# Patient Record
Sex: MALE | Race: WHITE
[De-identification: names, ages, dates, MRNs, and addresses within clinical notes are randomized per-mention and may not be internally consistent; named-entity substitution may affect disease eponyms.]

---

## 2018-10-29 ENCOUNTER — HOSPITAL ENCOUNTER (INPATIENT)
Dept: HOSPITAL 63 - ER | Age: 48
LOS: 1 days | Discharge: HOME | DRG: 684 | End: 2018-10-30
Attending: INTERNAL MEDICINE | Admitting: INTERNAL MEDICINE
Payer: OTHER GOVERNMENT

## 2018-10-29 VITALS — DIASTOLIC BLOOD PRESSURE: 78 MMHG | SYSTOLIC BLOOD PRESSURE: 117 MMHG

## 2018-10-29 VITALS — BODY MASS INDEX: 26.11 KG/M2 | HEIGHT: 73 IN | WEIGHT: 197 LBS

## 2018-10-29 DIAGNOSIS — N18.9: ICD-10-CM

## 2018-10-29 DIAGNOSIS — Z82.49: ICD-10-CM

## 2018-10-29 DIAGNOSIS — K72.90: ICD-10-CM

## 2018-10-29 DIAGNOSIS — N17.9: Primary | ICD-10-CM

## 2018-10-29 DIAGNOSIS — E86.0: ICD-10-CM

## 2018-10-29 DIAGNOSIS — K21.9: ICD-10-CM

## 2018-10-29 DIAGNOSIS — E87.6: ICD-10-CM

## 2018-10-29 DIAGNOSIS — F41.9: ICD-10-CM

## 2018-10-29 DIAGNOSIS — R53.82: ICD-10-CM

## 2018-10-29 DIAGNOSIS — Z82.5: ICD-10-CM

## 2018-10-29 LAB
ALBUMIN SERPL-MCNC: 4.4 G/DL (ref 3.4–5)
ALP SERPL-CCNC: 109 U/L (ref 46–116)
ALT SERPL-CCNC: 74 U/L (ref 16–63)
AMPHETAMINE/METHAMPHETAMINE: (no result)
ANION GAP SERPL CALC-SCNC: 14 MMOL/L (ref 6–14)
APTT PPP: YELLOW S
AST SERPL-CCNC: 42 U/L (ref 15–37)
BACTERIA #/AREA URNS HPF: 0 /HPF
BARBITURATES UR-MCNC: (no result) UG/ML
BASOPHILS # BLD AUTO: 0.1 X10^3/UL (ref 0–0.2)
BASOPHILS NFR BLD: 1 % (ref 0–3)
BENZODIAZ UR-MCNC: (no result) UG/L
BILIRUB DIRECT SERPL-MCNC: 0.1 MG/DL (ref 0–0.2)
BILIRUB SERPL-MCNC: 0.5 MG/DL (ref 0.2–1)
BILIRUB UR QL STRIP: (no result)
CA-I SERPL ISE-MCNC: 16 MG/DL (ref 8–26)
CALCIUM SERPL-MCNC: 9 MG/DL (ref 8.5–10.1)
CANNABINOIDS UR-MCNC: (no result) UG/L
CHLORIDE SERPL-SCNC: 101 MMOL/L (ref 98–107)
CO2 SERPL-SCNC: 26 MMOL/L (ref 21–32)
COCAINE UR-MCNC: (no result) NG/ML
CREAT SERPL-MCNC: 1.4 MG/DL (ref 0.7–1.3)
EOSINOPHIL NFR BLD: 0.2 X10^3/UL (ref 0–0.7)
EOSINOPHIL NFR BLD: 2 % (ref 0–3)
ERYTHROCYTE [DISTWIDTH] IN BLOOD BY AUTOMATED COUNT: 13.9 % (ref 11.5–14.5)
FIBRINOGEN PPP-MCNC: CLEAR MG/DL
GFR SERPLBLD BASED ON 1.73 SQ M-ARVRAT: 53.9 ML/MIN
GLUCOSE SERPL-MCNC: 107 MG/DL (ref 70–99)
GLUCOSE UR STRIP-MCNC: (no result) MG/DL
HCT VFR BLD CALC: 46.5 % (ref 39–53)
HGB BLD-MCNC: 16.1 G/DL (ref 13–17.5)
LIPASE: 173 U/L (ref 73–393)
LYMPHOCYTES # BLD: 4.8 X10^3/UL (ref 1–4.8)
LYMPHOCYTES NFR BLD AUTO: 48 % (ref 24–48)
MAGNESIUM SERPL-MCNC: 2 MG/DL (ref 1.8–2.4)
MCH RBC QN AUTO: 29 PG (ref 25–35)
MCHC RBC AUTO-ENTMCNC: 35 G/DL (ref 31–37)
MCV RBC AUTO: 85 FL (ref 79–100)
METHADONE SERPL-MCNC: (no result) NG/ML
MONO #: 0.8 X10^3/UL (ref 0–1.1)
MONOCYTES NFR BLD: 8 % (ref 0–9)
NEUT #: 4.1 X10^3UL (ref 1.8–7.7)
NEUTROPHILS NFR BLD AUTO: 41 % (ref 31–73)
NITRITE UR QL STRIP: (no result)
OPIATES UR-MCNC: (no result) NG/ML
PCP SERPL-MCNC: (no result) MG/DL
PLATELET # BLD AUTO: 231 X10^3/UL (ref 140–400)
POTASSIUM SERPL-SCNC: 3 MMOL/L (ref 3.5–5.1)
PROT SERPL-MCNC: 7.7 G/DL (ref 6.4–8.2)
RBC # BLD AUTO: 5.49 X10^6/UL (ref 4.3–5.7)
RBC #/AREA URNS HPF: 0 /HPF (ref 0–2)
SODIUM SERPL-SCNC: 141 MMOL/L (ref 136–145)
SP GR UR STRIP: 1.01
SQUAMOUS #/AREA URNS LPF: (no result) /LPF
UROBILINOGEN UR-MCNC: 0.2 MG/DL
WBC # BLD AUTO: 10.1 X10^3/UL (ref 4–11)
WBC #/AREA URNS HPF: 0 /HPF (ref 0–4)

## 2018-10-29 PROCEDURE — 85610 PROTHROMBIN TIME: CPT

## 2018-10-29 PROCEDURE — 36415 COLL VENOUS BLD VENIPUNCTURE: CPT

## 2018-10-29 PROCEDURE — 96374 THER/PROPH/DIAG INJ IV PUSH: CPT

## 2018-10-29 PROCEDURE — G0479 DRUG TEST PRESUMP NOT OPT: HCPCS

## 2018-10-29 PROCEDURE — 80048 BASIC METABOLIC PNL TOTAL CA: CPT

## 2018-10-29 PROCEDURE — 83880 ASSAY OF NATRIURETIC PEPTIDE: CPT

## 2018-10-29 PROCEDURE — 96360 HYDRATION IV INFUSION INIT: CPT

## 2018-10-29 PROCEDURE — 71275 CT ANGIOGRAPHY CHEST: CPT

## 2018-10-29 PROCEDURE — 93005 ELECTROCARDIOGRAM TRACING: CPT

## 2018-10-29 PROCEDURE — 84484 ASSAY OF TROPONIN QUANT: CPT

## 2018-10-29 PROCEDURE — 85379 FIBRIN DEGRADATION QUANT: CPT

## 2018-10-29 PROCEDURE — 80307 DRUG TEST PRSMV CHEM ANLYZR: CPT

## 2018-10-29 PROCEDURE — 85025 COMPLETE CBC W/AUTO DIFF WBC: CPT

## 2018-10-29 PROCEDURE — 84443 ASSAY THYROID STIM HORMONE: CPT

## 2018-10-29 PROCEDURE — 71046 X-RAY EXAM CHEST 2 VIEWS: CPT

## 2018-10-29 PROCEDURE — 80061 LIPID PANEL: CPT

## 2018-10-29 PROCEDURE — 83690 ASSAY OF LIPASE: CPT

## 2018-10-29 PROCEDURE — 82550 ASSAY OF CK (CPK): CPT

## 2018-10-29 PROCEDURE — 93306 TTE W/DOPPLER COMPLETE: CPT

## 2018-10-29 PROCEDURE — 81001 URINALYSIS AUTO W/SCOPE: CPT

## 2018-10-29 PROCEDURE — 80076 HEPATIC FUNCTION PANEL: CPT

## 2018-10-29 PROCEDURE — 85730 THROMBOPLASTIN TIME PARTIAL: CPT

## 2018-10-29 PROCEDURE — 93970 EXTREMITY STUDY: CPT

## 2018-10-29 PROCEDURE — 96372 THER/PROPH/DIAG INJ SC/IM: CPT

## 2018-10-29 PROCEDURE — 83735 ASSAY OF MAGNESIUM: CPT

## 2018-10-29 RX ADMIN — SODIUM CHLORIDE, SODIUM LACTATE, POTASSIUM CHLORIDE, AND CALCIUM CHLORIDE SCH MLS/HR: .6; .31; .03; .02 INJECTION, SOLUTION INTRAVENOUS at 22:30

## 2018-10-29 NOTE — RAD
Examination: CHEST PA   LATERAL

 

History: Chest pain, dizziness

 

Comparison/Correlation: None

 

Findings: PA and lateral views of the chest were obtained. Heart size and 

pulmonary vasculature are normal. No infiltrate or pleural effusion. No 

pneumothorax. Bony structures are unremarkable for the patient's age.

 

 

Impression:

No active disease.

 

Electronically signed by: Adam Zuñiga MD (10/29/2018 11:32 PM) [facility]

## 2018-10-29 NOTE — RAD
Examination: CT ANGIOGRAPHY CHEST

 

History: Omni 300 60cc: PE protocol: Chest pain, dizziness, chills, 

elevated d-dimer

 

Comparison/Correlation: None

 

Findings: Axial images of the chest were obtained following 60 cc 

Omnipaque 300 IV. Sagittal and coronal reformatted images were provided.

 

Pulmonary arterial vasculature is normal with no thromboembolic disease. 

No enlarged thoracic lymph nodes. No pleural or pericardial effusion. No 

infiltrate. No suspicious pulmonary nodule or mass. Few nonspecific 

low-attenuation lesions involving the lung fields noted. Partially 

visualized upper abdomen is unremarkable. Thoracic aorta is not opacified 

for arteriographic assessment on this exam. Morphology is grossly 

unremarkable.

 

 

Impression:

No pulmonary arterial thromboembolic disease.

 

Electronically signed by: Adam Zuñiga MD (10/29/2018 8:40 PM) [facility]

## 2018-10-30 VITALS — SYSTOLIC BLOOD PRESSURE: 110 MMHG | DIASTOLIC BLOOD PRESSURE: 67 MMHG

## 2018-10-30 VITALS — SYSTOLIC BLOOD PRESSURE: 127 MMHG | DIASTOLIC BLOOD PRESSURE: 73 MMHG

## 2018-10-30 LAB
ANION GAP SERPL CALC-SCNC: 8 MMOL/L (ref 6–14)
BASOPHILS # BLD AUTO: 0.1 X10^3/UL (ref 0–0.2)
BASOPHILS NFR BLD: 1 % (ref 0–3)
CA-I SERPL ISE-MCNC: 13 MG/DL (ref 8–26)
CALCIUM SERPL-MCNC: 8.8 MG/DL (ref 8.5–10.1)
CHLORIDE SERPL-SCNC: 103 MMOL/L (ref 98–107)
CHOLEST/HDLC SERPL: 5 {RATIO}
CO2 SERPL-SCNC: 29 MMOL/L (ref 21–32)
CREAT SERPL-MCNC: 1.3 MG/DL (ref 0.7–1.3)
EOSINOPHIL NFR BLD: 0.1 X10^3/UL (ref 0–0.7)
EOSINOPHIL NFR BLD: 1 % (ref 0–3)
ERYTHROCYTE [DISTWIDTH] IN BLOOD BY AUTOMATED COUNT: 13.9 % (ref 11.5–14.5)
GFR SERPLBLD BASED ON 1.73 SQ M-ARVRAT: 58.7 ML/MIN
GLUCOSE SERPL-MCNC: 91 MG/DL (ref 70–99)
HCT VFR BLD CALC: 42.4 % (ref 39–53)
HDLC SERPL-MCNC: 53 MG/DL (ref 40–60)
HGB BLD-MCNC: 14.6 G/DL (ref 13–17.5)
LDLC: 206 MG/DL (ref 0–100)
LYMPHOCYTES # BLD: 2.1 X10^3/UL (ref 1–4.8)
LYMPHOCYTES NFR BLD AUTO: 31 % (ref 24–48)
MCH RBC QN AUTO: 29 PG (ref 25–35)
MCHC RBC AUTO-ENTMCNC: 34 G/DL (ref 31–37)
MCV RBC AUTO: 85 FL (ref 79–100)
MONO #: 0.6 X10^3/UL (ref 0–1.1)
MONOCYTES NFR BLD: 8 % (ref 0–9)
NEUT #: 4 X10^3UL (ref 1.8–7.7)
NEUTROPHILS NFR BLD AUTO: 59 % (ref 31–73)
PLATELET # BLD AUTO: 190 X10^3/UL (ref 140–400)
POTASSIUM SERPL-SCNC: 3.9 MMOL/L (ref 3.5–5.1)
RBC # BLD AUTO: 4.98 X10^6/UL (ref 4.3–5.7)
SODIUM SERPL-SCNC: 140 MMOL/L (ref 136–145)
THYROID STIM HORMONE (TSH): 1.61 UIU/ML (ref 0.36–3.74)
TRIGL SERPL-MCNC: 193 MG/DL (ref 0–150)
VLDLC: 38 MG/DL (ref 0–40)
WBC # BLD AUTO: 6.8 X10^3/UL (ref 4–11)

## 2018-10-30 RX ADMIN — SODIUM CHLORIDE, SODIUM LACTATE, POTASSIUM CHLORIDE, AND CALCIUM CHLORIDE SCH MLS/HR: .6; .31; .03; .02 INJECTION, SOLUTION INTRAVENOUS at 04:30

## 2018-10-30 RX ADMIN — SODIUM CHLORIDE, SODIUM LACTATE, POTASSIUM CHLORIDE, AND CALCIUM CHLORIDE SCH MLS/HR: .6; .31; .03; .02 INJECTION, SOLUTION INTRAVENOUS at 10:45

## 2018-10-30 NOTE — EKG
[facility] [address], [place] [address]

Test Date:    2018-10-29               Test Time:    18:27:03

Pat Name:     KENYATTA DEVRIES             Department:   

Patient ID:   [id number]           Room:         121 A

Gender:       M                        Technician:   

:          [id number]               Requested By: LINA MORGAN

Order Number: [id number]            Reading MD:   Rick Griffin MD

                                 Measurements

Intervals                              Axis          

Rate:         66                       P:            37

MI:           140                      QRS:          36

QRSD:         96                       T:            31

QT:           418                                    

QTc:          440                                    

                           Interpretive Statements

SINUS RHYTHM



Electronically Signed On 2018 11:50:59 CDT by Rick Griffin MD

## 2018-10-30 NOTE — CARD
MR#: [id number]

Account#: [id number]

Accession#: [id number]

Date of Study: 10/30/2018

Ordering Physician: BULMARO HAHN, 

Referring Physician: GUILLERMINA DENNISON 

Tech: Kym Kasper JACOB





--------------- APPROVED REPORT --------------





EXAM: Two-dimensional and M-mode echocardiogram with Doppler and color Doppler.



Other Information 

Quality : GoodHR: 50bpm

Rhythm : Bradycardia



INDICATION

Chest Pain 



2D DIMENSIONS 

RVDd2.7 (2.9-3.5cm)Left Atrium(2D)3.1 (1.6-4.0cm)

IVSd0.9 (0.7-1.1cm)Aortic Root(2D)2.8 (2.0-3.7cm)

LVDd5.0 (3.9-5.9cm)LVOT Diameter2.0 (1.8-2.4cm)

PWd0.9 (0.7-1.1cm)LVDs2.6 (2.5-4.0cm)

FS (%) 42.0 %LVEF(%)73.0 (>50%)



Aortic Valve

AoV Peak Alexys.1.5cm/sAoV VTI32.0cm

AO Peak GR.9.0mmHgLVOT Peak Alexys.1.2cm/s

LVOT  VTI 28.00cmAO Mean GR.6mmHg

LARA (VMAX)2.35nl2NLO   (VTI)2.60cm2



Mitral Valve

MV E Velocity1.1cm/sMV E Peak Gr.5mmHg

MV DECEL [id number] A Velocity0.6cm/s

MV E Mean Gr.13mmHgE/A  Ratio1.8

MV A [id number]



TDI

Lateral E' P. V19.00cm/sMedial E' P. V16.00cm/s

E/Lateral E'0.1E/Medial E'0.1



Pulmonary Valve

PV Peak Velocity1.4cm/sPV Peak Grad.8mmHg



Tricuspid Valve

TR P. [id number]/sRAP [id number]

TR Peak Gr.[id number]



 LEFT VENTRICLE 

The left ventricle is normal size. There is normal left ventricular wall thickness. The left ventricu
lar systolic function is normal and the ejection fraction is within normal range. The Ejection Fracti
on is 60-65%. There is normal LV segmental wall motion. The left ventricular diastolic function and f
illing is normal for age.



 RIGHT VENTRICLE 

The right ventricle is normal size. There is normal right ventricular wall thickness. The right ventr
icular systolic function is normal.



 ATRIA 

The left atrium size is normal. The right atrium size is normal. The interatrial septum is intact wit
h no evidence for an atrial septal defect or patent foramen ovale as noted on 2-D or Doppler imaging.




 AORTIC VALVE 

The aortic valve is normal in structure and function. The aortic valve is trileaflet. Doppler and Col
or Flow revealed no significant aortic regurgitation. There is no significant aortic valvular stenosi
s.



 MITRAL VALVE 

The mitral valve is normal in structure and function. There is no evidence of mitral valve prolapse. 
There is no mitral valve stenosis. Doppler and Color Flow revealed no mitral valve regurgitation note
d.



 TRICUSPID VALVE 

The tricuspid valve is normal in structure and function. Doppler and Color Flow revealed trace tricus
pid regurgitation. The PA pressure was estimated at 29 mmHg. There is no tricuspid valve prolapse or 
vegetation. There is no tricuspid valve stenosis.



 PULMONIC VALVE 

Not well visualized. Doppler and Color Flow revealed no pulmonic valvular regurgitation. There is no 
pulmonic valvular stenosis.



 GREAT VESSELS 

The aortic root is normal in size. The ascending aorta is normal in size. The IVC is normal in size a
nd collapses >50% with inspiration.



 PERICARDIAL EFFUSION 

There is no evidence of significant pericardial effusion.



Critical Notification

Critical Value: No



<Conclusion>

The left ventricular systolic function is normal and the ejection fraction is within normal range. Th
e Ejection Fraction is 60-65%.

There is normal LV segmental wall motion.



Signed by : Rick Griffin, 

Electronically Approved : 10/30/2018 15:03:00

## 2018-10-30 NOTE — HP
ADMIT DATE:  10/29/2018



HISTORY OF PRESENT ILLNESS:  The patient is a 48-year-old  male

patient, who came to the Emergency Room with a complaint of being lightheaded,

diaphoretic down to the floor, then nausea and vomiting continued about 30

minutes and worse every time he tried to stand.  He has history of

lightheadedness on standing, but no illness, fever or chills.  No cardiac

symptoms except as above.  He runs 3 miles several times a week.  Did complain

of chronic fatigue and memory issues, recently had labs as well as sleep study

that was reportedly normal.  However, his past medical history is significant

only for anxiety.  Past surgical history is left inguinal hernia repair.  He was

investigated extensively in the Emergency Room and admitted with near syncope,

fatigue, he was found to be hypokalemic, and has acute kidney injury with a BUN

of 16, creatinine 1.4, and also mild hepatic insufficiency.  He was admitted and

his first set of cardiac enzymes was normal.  He was admitted for further

evaluation and to consult the cardiology team.



PAST MEDICAL HISTORY:  Significant for anxiety disorder.



PAST SURGICAL HISTORY:  Significant for left inguinal hernia repair.



ALLERGIES:  He has no known drug allergies.



MEDICATIONS:  He is currently on Lexapro.  He is on Lexapro 10 mg once a day.



FAMILY HISTORY:  Significant for one younger brother who is healthy.  Father

 at age of 78 and mother  at age of 62 because of COPD and complication

of surgery.



SOCIAL HISTORY:  He is , has 3 children.  He never smoked, drinks alcohol

occasionally.  Does not use any drugs.  He is retiring from the [employer].



REVIEW OF SYSTEMS:  As per history of present illness.



PHYSICAL EXAMINATION:

GENERAL:  On examining him, he looked well.  On arrival to the Emergency Room,

pale, but o jaundice, cyanosis, or thyromegaly.  No jugular venous distension. 

No limb edema.

VITAL SIGNS:  His heart rate was 54, blood pressure was 146/89, temperature was

98.5, respiratory rate was 16, and oxygen saturation was 100% on room air.

HEAD, EYES, EARS, NOSE AND THROAT:  Showed normocephalic, atraumatic.

NECK:  Supple.

HEART:  Showed normal first and second heart sounds with no gallop, rub or

murmur.

CHEST:  Clear to auscultation.  No crepitation or rhonchi.

ABDOMEN:  Distended, soft, nontender.  No guarding or rigidity.  No

organomegaly.  All hernial orifices intact.  Bowel sounds normal.

NEUROLOGIC:  He was awake, alert, responding appropriately.  Cranial nerves are

intact.

EXTREMITIES:  He moves extremities without difficulty.  He ambulates without

difficulty.



LABORATORY DATA:  While in the Emergency Room, his lab work showed a serum

sodium 141, potassium 3, chloride 101, bicarbonate 26, anion gap of 14, BUN 16,

creatinine 1.4, estimated GFR was 54 mL per minute.  His glucose was 107,

calcium was 9.  Magnesium was 2.  Total bilirubin, AST, ALT, alkaline

phosphatase are slightly elevated.  CK was 403.  First set of cardiac enzymes to

be less than 0.017.  Total protein was 7.7, albumin was 4.4.  Prothrombin time

was 9.8, INR of 1, aPTT was 27.  D-dimer was high at 0.61.  Urinalysis was

essentially unremarkable and urine toxicology screen was negative.  He has had a

chest x-ray, which was basically unremarkable.  The heart size and pulmonary

vasculature are normal.  No infiltrate or pleural effusion, no pneumothorax. 

Bony structures unremarkable for the patient's age.  CT angio of the chest

showed that pulmonary vasculature is normal with no thromboembolic disease.  No

enlarged thoracic lymph nodes.  No pleural pericardial effusion.  No infiltrate,

no suspicious pulmonary nodules or masses, few are nonspecific.  Low attenuation

lesion involving the lung fields noted, partially visualized upper abdomen is

unremarkable.  Thoracic aorta is not opacified for arteriographic assessment on

this exam.  Morphology is grossly unremarkable.



ASSESSMENT AND PLAN:  The patient will be admitted.  We will do a bilateral

lower extremity Doppler ultrasound.  We will consult the Cardiology team and

decide on further management accordingly.





______________________________

GUILLERMINA DENNISON MD



DR:  PASHA/usha  JOB#:  [id number] / [id number]

DD:  10/30/2018 13:54  DT:  10/30/2018 19:03

## 2018-10-30 NOTE — PDOC2
SELMABULMARO RUBY TORRES 10/30/18 0835:


CONSULT


Date of Admission


DATE: 10/30/18 


TIME: 08:35


Reason for Consult:


near syncope


Problem List


Problems


Medical Problems:


(1) Chest pain


Status: Acute  








History of Present Illness


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 

complaints of near syncope.  He reports he had just gotten home and was 

standing talking with wife about upcoming TDY.  He says it had been about 15 

minutes since he walked in the door and he had sudden onset of  diaphoresis and 

lightheaded.  He states he went down to floor but no loss of consciousness. He 

then developed  nausea vomiting.  He reports symptoms that continued about 30 

min and became worse every time tried to stand so he came to the ED.  He was 

noted to be mildly dehydrated and fluids were given.  He reports prior 

lightheadedness on standing occasionally but only to a minimal degree.  He 

denies chest discomfort, palpitations, prior syncope.  He denies recent illness

, fever or chills.  He denies change in diet or fluid intake but does state 

that he thinks he is dehydrated off and on.  He denies cardiac symptoms except 

as above, denies congestive symptoms.  He regularly does PT for the  

and runs 3 miles several times weekly without issues.  He does report that he 

has been having chronic fatigue and memory issues, or difficulty concentrating 

recently and had labs by his PCP, as well as a sleep study that was reportedly 

normal.


Past Medical History


He suffers from anxiety for which he takes Lexapro, otherwise he has no 

significant medical history


Past Surgical History


No pertinent history


Family History


Father and all uncles with history of premature coronary disease and MIs.


Social History


non smoker, no significant ETOH,  no illicit drugs.  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  Due to 

TDY tomorrow to [facility].


Current Medications





Current Medications


Ondansetron HCl (Zofran) 4 mg STK-MED ONCE .ROUTE ;  Start 10/29/18 at 18:45;  

Stop 10/29/18 at 18:46;  Status DC


Aspirin (Children'S Aspirin) 324 mg 1X  ONCE PO  Last administered on 10/29/

18at 19:18;  Start 10/29/18 at 19:00;  Stop 10/29/18 at 19:01;  Status DC


Lactated Ringer's 1,000 ml @  1,000 mls/hr Q1H IV  Last administered on 10/29/

18at 19:18;  Start 10/29/18 at 18:49;  Stop 10/29/18 at 19:48;  Status DC


Lorazepam (Ativan) 1 mg 1X  ONCE PO  Last administered on 10/29/18at 19:18;  

Start 10/29/18 at 19:00;  Stop 10/29/18 at 19:01;  Status DC


Ondansetron HCl (Zofran) 8 mg 1X  ONCE IV  Last administered on 10/29/18at 19:17

;  Start 10/29/18 at 19:15;  Stop 10/29/18 at 19:16;  Status DC


Potassium Chloride (KCl Oral Soln) 40 meq 1X  ONCE PO  Last administered on 10/

29/18at 20:31;  Start 10/29/18 at 19:45;  Stop 10/29/18 at 19:46;  Status DC


Magnesium Hydroxide (Milk Of Magnesia) 2,400 mg 1X  ONCE PO  Last administered 

on 10/29/18at 20:32;  Start 10/29/18 at 19:45;  Stop 10/29/18 at 19:46;  Status 

DC


Enoxaparin Sodium (Lovenox 100mg Syringe) 90 mg 1X  ONCE SQ  Last administered 

on 10/29/18at 20:32;  Start 10/29/18 at 20:00;  Stop 10/29/18 at 20:03;  Status 

DC


Iohexol (Omnipaque 300 Mg/ml) 75 ml 1X  ONCE IV  Last administered on 10/29/

18at 20:10;  Start 10/29/18 at 20:15;  Stop 10/29/18 at 20:16;  Status DC


Ondansetron HCl (Zofran) 4 mg PRN Q4HRS  PRN IV NAUSEA/VOMITING;  Start 10/29/

18 at 22:15;  Stop 10/30/18 at 22:14


Enoxaparin Sodium (Lovenox 100mg Syringe) 90 mg BID SQ ;  Start 10/30/18 at 09:

00


Aspirin (Children'S Aspirin) 81 mg DAILY PO ;  Start 10/30/18 at 09:00


Lactated Ringer's 1,000 ml @  160 mls/hr Q6H15M IV  Last administered on 10/29/

18at 22:30;  Start 10/29/18 at 22:15


Potassium Chloride (KCl Oral Soln) 40 meq DAILY PO ;  Start 10/30/18 at 09:00


Allergies:  


Coded Allergies:  


     No Known Drug Allergies (Unverified , 10/29/18)


Review of System


as scheduled


General:  Alert, Oriented X3, Cooperative, No acute distress


HEENT:  Atraumatic, EOMI, Mucous membr. moist/pink, Other (no carotid bruits, 

JVD or HJR)


Lungs:  Clear to auscultation, Normal air movement


Heart:  Regular rate, Normal S1, Normal S2, No murmurs, Other (no gallops, 

clicks or rubs)


Abdomen:  Normal bowel sounds, Soft, No tenderness


Extremities:  No clubbing, No cyanosis, No edema, Normal pulses


Neuro:  Normal speech, Strength at 5/5 X4 ext, Cranial nerves 3-12 NL


Psych/Mental Status:  Mental status NL, Mood NL


VITALS





Vital Signs








  Date Time  Temp Pulse Resp B/P (MAP) Pulse Ox O2 Delivery O2 Flow Rate FiO2


 


10/30/18 03:00 98.1 51 16 127/73 (91) 95   


 


10/29/18 23:42      Room Air  








Labs





Laboratory Tests








Test


 10/29/18


18:36 10/29/18


20:39 10/30/18


05:42


 


White Blood Count


 10.1 x10^3/uL


(4.0-11.0) 


 6.8 x10^3/uL


(4.0-11.0)


 


Red Blood Count


 5.49 x10^6/uL


(4.30-5.70) 


 4.98 x10^6/uL


(4.30-5.70)


 


Hemoglobin


 16.1 g/dL


(13.0-17.5) 


 14.6 g/dL


(13.0-17.5)


 


Hematocrit


 46.5 %


(39.0-53.0) 


 42.4 %


(39.0-53.0)


 


Mean Corpuscular Volume 85 fL ()   85 fL () 


 


Mean Corpuscular Hemoglobin 29 pg (25-35)   29 pg (25-35) 


 


Mean Corpuscular Hemoglobin


Concent 35 g/dL


(31-37) 


 34 g/dL


(31-37)


 


Red Cell Distribution Width


 13.9 %


(11.5-14.5) 


 13.9 %


(11.5-14.5)


 


Platelet Count


 231 x10^3/uL


(140-400) 


 190 x10^3/uL


(140-400)


 


Neutrophils (%) (Auto) 41 % (31-73)   59 % (31-73) 


 


Lymphocytes (%) (Auto) 48 % (24-48)   31 % (24-48) 


 


Monocytes (%) (Auto) 8 % (0-9)   8 % (0-9) 


 


Eosinophils (%) (Auto) 2 % (0-3)   1 % (0-3) 


 


Basophils (%) (Auto) 1 % (0-3)   1 % (0-3) 


 


Neutrophils # (Auto)


 4.1 x10^3uL


(1.8-7.7) 


 4.0 x10^3uL


(1.8-7.7)


 


Lymphocytes # (Auto)


 4.8 x10^3/uL


(1.0-4.8) 


 2.1 x10^3/uL


(1.0-4.8)


 


Monocytes # (Auto)


 0.8 x10^3/uL


(0.0-1.1) 


 0.6 x10^3/uL


(0.0-1.1)


 


Eosinophils # (Auto)


 0.2 x10^3/uL


(0.0-0.7) 


 0.1 x10^3/uL


(0.0-0.7)


 


Basophils # (Auto)


 0.1 x10^3/uL


(0.0-0.2) 


 0.1 x10^3/uL


(0.0-0.2)


 


Prothrombin Time


 9.8 SEC


(9.4-11.4) 


 





 


Prothromb Time International


Ratio 1.0 (0.9-1.1) 


 


 





 


Activated Partial


Thromboplast Time 21 SEC (23-33) 


 


 





 


D-Dimer (Lisette)


 0.61 mg/L


(0.00-0.50) 


 





 


Sodium Level


 141 mmol/L


(136-145) 


 140 mmol/L


(136-145)


 


Potassium Level


 3.0 mmol/L


(3.5-5.1) 


 3.9 mmol/L


(3.5-5.1)


 


Chloride Level


 101 mmol/L


() 


 103 mmol/L


()


 


Carbon Dioxide Level


 26 mmol/L


(21-32) 


 29 mmol/L


(21-32)


 


Anion Gap 14 (6-14)   8 (6-14) 


 


Blood Urea Nitrogen


 16 mg/dL


(8-26) 


 13 mg/dL


(8-26)


 


Creatinine


 1.4 mg/dL


(0.7-1.3) 


 1.3 mg/dL


(0.7-1.3)


 


Estimated GFR


(Cockcroft-Gault) 53.9 


 


 58.7 





 


Glucose Level


 107 mg/dL


(70-99) 


 91 mg/dL


(70-99)


 


Calcium Level


 9.0 mg/dL


(8.5-10.1) 


 8.8 mg/dL


(8.5-10.1)


 


Magnesium Level


 2.0 mg/dL


(1.8-2.4) 


 





 


Total Bilirubin


 0.5 mg/dL


(0.2-1.0) 


 





 


Direct Bilirubin


 0.1 mg/dL


(0.0-0.2) 


 





 


Aspartate Amino Transf


(AST/SGOT) 42 U/L (15-37) 


 


 





 


Alanine Aminotransferase


(ALT/SGPT) 74 U/L (16-63) 


 


 





 


Alkaline Phosphatase


 109 U/L


() 


 





 


Creatine Kinase


 403 U/L


() 


 





 


Troponin I Quantitative


 < 0.017 ng/mL


(0-0.055) 


 





 


NT-Pro-B-Type Natriuretic


Peptide 43 pg/mL


(0-124) 


 





 


Total Protein


 7.7 g/dL


(6.4-8.2) 


 





 


Albumin


 4.4 g/dL


(3.4-5.0) 


 





 


Lipase


 173 U/L


() 


 





 


Urine Collection Type  Unknown  


 


Urine Color  Yellow  


 


Urine Clarity  Clear  


 


Urine pH  6.5  


 


Urine Specific Gravity  1.015  


 


Urine Protein


 


 Neg


(NEG-TRACE) 





 


Urine Glucose (UA)


 


 Neg mg/dL


(NEG) 





 


Urine Ketones (Stick)


 


 Neg mg/dL


(NEG) 





 


Urine Blood  Neg (NEG)  


 


Urine Nitrite  Neg (NEG)  


 


Urine Bilirubin  Neg (NEG)  


 


Urine Urobilinogen Dipstick


 


 0.2 mg/dL (0.2


mg/dL) 





 


Urine Leukocyte Esterase  Neg (NEG)  


 


Urine RBC  0 /HPF (0-2)  


 


Urine WBC  0 /HPF (0-4)  


 


Urine Squamous Epithelial


Cells 


 Occ /LPF 


 





 


Urine Bacteria  0 /HPF (0-FEW)  


 


Urine Opiates Screen  Neg (NEG)  


 


Urine Methadone Screen  Neg (NEG)  


 


Urine Barbiturates  Neg (NEG)  


 


Urine Phencyclidine Screen  Neg (NEG)  


 


Urine


Amphetamine/Methamphetamine 


 Neg (NEG) 


 





 


Urine Benzodiazepines Screen  Neg (NEG)  


 


Urine Cocaine Screen  Neg (NEG)  


 


Urine Cannabinoids Screen  Neg (NEG)  


 


Urine Ethyl Alcohol  Neg (NEG)  








Images


EKG - sinus mickey without acute abn


CXR - no acute abn


CTA - no PE


Assessment/Plan


1.  near syncope


2.  fatigue


3.  hypokalemia


4.  acute renal insufficiency secondary to mild dehydration


5.  mild hepatic insufficiency





Suspect near syncope secondary to orthostatic hypotension and mild dehydration, 

however with additional complaints of recent chronic fatigue, active duty 

status and family history of premature coronary artery disease we will check 

echo for LV function and structure.  Suggest outpatient event monitoring and 

stress testing if echo ok.  CRI is resolved after fluids. Request recent labs (

lipids, thyroid, vitamin d ect) from PCP.





JUSTIN COFFEY MD 10/30/18 2306:


CONSULT


Assessment/Plan


Pt. seen and examined. Agree with above NP note with following comments: 


Vasovagal syncope. 


Normal exam, EKG/echo.


No further testing needed. 


ok to DC from CV standpoint. 


pls call with questions.











BULMARO HAHN Oct 30, 2018 08:35


JUSTIN COFFEY MD Oct 30, 2018 23:06

## 2018-10-30 NOTE — DS
DATE OF DISCHARGE:  10/30/2018



HOSPITAL COURSE:  The patient is a 48-year-old  male patient who came

in with complaints of syncope, lightheadedness, diaphoresis, nausea and

vomiting.  He has had no cardiac symptoms except as above.  He runs 3 miles

several times a week.  He did complain of chronic fatigue.  He was extensively

investigated in the Emergency Room and was found to have hypokalemia as well as

impaired liver enzymes and acute kidney injury.  His cardiac enzymes were less

than 0.017.  We did replenish his potassium and his potassium is up to 3.9.  His

kidney function improved.  BUN is now 13, creatinine 1.3.  He has no postural

hypertension.  An echocardiogram done, which showed normal left ventricular

systolic function.  He was evaluated by the Cardiology team and they recommended

that the patient can be discharged home safely and to follow with the office as

an outpatient for event monitor, if necessary and if the patient has recurrence

of his symptoms, the most likely explanation is anxiety.





PHYSICAL EXAMINATION:

GENERAL:  When I saw him this afternoon, he looked well and was clearly in no

apparent respiratory distress.  No pallor, jaundice, cyanosis, or thyromegaly. 

No jugular venous distension.  No lower limb edema.

VITAL SIGNS:  His heart rate was 51, blood pressure was 110/67, temperature was

98.5, respiratory rate 20, and oxygen saturation was 95% on room air.

HEENT:  Examination of the head, eyes, ears, nose and throat showed

normocephalic, atraumatic.

NECK:  Supple.

HEART:  Showed normal first and second sounds.  No gallop, rub or murmur.

CHEST:  Clear to auscultation.  No crepitation or rhonchi.

ABDOMEN:  Distended, soft, nontender.  No guarding or rigidity.  No

organomegaly.  Hernial orifices are intact and bowel sounds normal.

NEUROLOGIC:  He was awake, alert, responding appropriately.  His cranial nerves

are in intact.

EXTREMITIES:  He moves extremities without difficulty.  He ambulates without

assistance or without assistive devices.





LABORATORY DATA AND IMAGING STUDIES:  His lab work showed a serum sodium 140,

potassium 3.9, chloride 103, bicarbonate 29, anion gap of 8, BUN 13, creatinine

1.3, estimated GFR was 59 mL per minute.  His glucose was 91, calcium was 8.8. 

We did a venous Doppler ultrasound of both lower extremities showed no evidence

of deep vein thrombosis.  His echocardiogram was normal.





DISCHARGE MEDICATIONS:  The patient was discharged home to continue his Lexapro.





FINAL DISCHARGE DIAGNOSES:  Near syncope, hypokalemia, acute kidney injury that

resolved, slightly impaired liver enzymes most likely explained by severe

anxiety.





DISCHARGE INSTRUCTION:  He was discharged home to follow with Cardiology team as

an outpatient.





______________________________

GUILLERMINA DENNISON MD



DR:  PASHA/usha  JOB#:  [id number] / [id number]

DD:  10/30/2018 14:01  DT:  10/30/2018 23:30

## 2021-10-02 ENCOUNTER — HOSPITAL ENCOUNTER (EMERGENCY)
Dept: HOSPITAL 63 - ER | Age: 51
Discharge: HOME | End: 2021-10-02
Payer: OTHER GOVERNMENT

## 2021-10-02 VITALS — WEIGHT: 196.21 LBS | HEIGHT: 69 IN | BODY MASS INDEX: 29.06 KG/M2

## 2021-10-02 VITALS — SYSTOLIC BLOOD PRESSURE: 128 MMHG | DIASTOLIC BLOOD PRESSURE: 70 MMHG

## 2021-10-02 DIAGNOSIS — R60.0: ICD-10-CM

## 2021-10-02 DIAGNOSIS — M25.562: Primary | ICD-10-CM

## 2021-10-02 DIAGNOSIS — F41.9: ICD-10-CM

## 2021-10-02 PROCEDURE — 73564 X-RAY EXAM KNEE 4 OR MORE: CPT

## 2021-10-02 PROCEDURE — 99284 EMERGENCY DEPT VISIT MOD MDM: CPT

## 2021-10-02 PROCEDURE — 99283 EMERGENCY DEPT VISIT LOW MDM: CPT

## 2021-10-02 NOTE — PHYS DOC
Past History


Past Medical History:  Anxiety


Past Surgical History:  No Surgical History


Alcohol Use:  None


Drug Use:  None





Adult General


Chief Complaint


Chief Complaint:  KNEE INJURY





HPI


HPI





Patient is a 51-year-old male presenting for left anterior knee pain.  Onset was

2 weeks ago.  He is an active healthy male who takes Cymbalta only for mental 

health issues.  States he is actively involved in [employer] classes and often 

falls to his knees grappling.  Denies any known mechanism of injury or inciting 

event, states it is probably repetitive injury of falling to the soft mat onto 

his knees.  States he has been providing supportive care at home to himself with

ice, ibuprofen, Motrin and Voltaren gel without any improvement.  Reports there 

is development of a small effusion that he was concerned is getting larger over 

past 24 hours and feels hot to the touch prompting him to come in for 

evaluation.  Denies any other concerning medical issues or exposures, no prior 

history of IV drug use or other concerning risk factors





Review of Systems


Review of Systems


Fourteen body systems of review of systems have been reviewed. See HPI for 

pertinent positives and negative responses, other wise all other systems are 

negative, non-pertinent or non-contributory





Allergies


Allergies





Allergies








Coded Allergies Type Severity Reaction Last Updated Verified


 


  No Known Drug Allergies    10/29/18 No











Physical Exam


Physical Exam


Constitutional: Well developed, well nourished, no acute distress, non-toxic 

appearance. 


HENT: Normocephalic, atraumatic, bilateral external ears normal, oropharynx 

moist, no oral exudates, nose normal. 


Eyes: PERRLA, EOMI, conjunctiva normal, no discharge.  


Neck: Normal range of motion, no tenderness, supple, no stridor.  


Cardiovascular: Heart rate regular per monitor


Lungs & Thorax: No respiratory distress or accessory muscle use, bilateral chest

rise


Abdomen: Abdomen soft, non-tender, bowel sounds present in all quadrants, no 

guarding or rebound, nonacute abdomen.


Skin: Warm, dry, no erythema, no rash.  


Back: No tenderness, no CVA tenderness.  


Extremities: Tenderness present to left anterior knee over side of patella that 

is slightly edematous, comprehensive formal exam of left knee, left hip and left

ankle grossly nonconcerning specifically with negative valgus and varus strain, 

anterior and posterior Lachman's, and patellar grind test.  No cyanosis, no 

clubbing, range of motion of left knee fully intact with pain during flexion 

only


Neurologic: Alert and oriented X 3, grossly normal motor & sensory function, no 

focal deficits noted. 


Psychologic: Affect normal, judgement normal, mood normal.





Current Patient Data


Vital Signs





                                   Vital Signs








  Date Time  Temp Pulse Resp B/P (MAP) Pulse Ox O2 Delivery O2 Flow Rate FiO2


 


10/2/21 19:03 97.5 55 16 128/70 (89) 98 Room Air  











EKG


EKG


[]





Radiology/Procedures


Radiology/Procedures





EXAMINATION: Left knee radiograph.





VIEWS: 4





COMPARISON: None





INDICATION:51 years, Male, left anterior knee pain, fall.





FINDINGS: 


No acute fracture, dislocation or subluxation. Superior patellar enthesophyte.  

Prepatellar soft tissue swelling. Small suprapatellar joint effusion.





IMPRESSION:


No acute fracture or dislocation. Prepatellar soft tissue swelling.





Electronically signed by: Celsa Akers MD (10/2/2021 8:11 PM) [facility]





Heart Score


C/O Chest Pain:  No


Risk Factors:


Risk Factors:  DM, Current or recent (<one month) smoker, HTN, HLP, family 

history of CAD, obesity.


Risk Scores:


Risk Factors:  DM, Current or recent (<one month) smoker, HTN, HLP, family 

history of CAD, obesity.





Course & Med Decision Making


Course & Med Decision Making


ABCs unremarkable


HPI physical exam and comprehensive ER work-up nonconcerning for any emergent or

 surgical issues


Discussed small effusion likely physiologic from repetitive trauma to anterior 

portion of knee.  No indication for antibiotics or drainage or any other 

invasive intervention in ER setting


Discharge home with close PCP follow-up and supportive care practices utilizing 

ice, compression and ibuprofen as needed advised.  Strict return precautions 

discussed with good understanding prior to ER departure





Dragon Disclaimer


Dragon Disclaimer


This electronic medical record was generated, in whole or in part, using a voice

 recognition dictation system.





Additional Procedures


Progress


Bedside MSK ultrasound performed of left anterior knee.  No gross abnormalities 

appreciated, patella intact, there is trace effusion present to anterior surface

 of patella without any concerning underlying abnormalities





Departure


Departure:


Impression:  


   Primary Impression:  


   Left anterior knee pain


Disposition:  01 HOME / SELF CARE / HOMELESS


Condition:  STABLE


Referrals:  


PCP,UNKNOWN (PCP)





Additional Instructions:  


You were seen for left anterior knee pain.  You should return to the ED if you 

develop worsening pain, fever, numbness, tingling, weakness, or any other new or

 concerning symptoms.  Your pain is most likely due to repetitive trauma during 

jujitsu and should improve with ibuprofen, compression wraps, stretching, and 

activity.  As discussed it is imperative you follow-up with your primary care 

physician to review ER visit today and need for next steps in care.  There might

 be indication for outpatient specialist consultation to ensure symptomatic 

improvement.  It was a pleasure to take care of you and I wish you the best 

going forward


Scripts


No Active Prescriptions or Reported Meds











VALERIA MILLAN DO                  Oct 2, 2021 19:43

## 2021-10-02 NOTE — RAD
EXAMINATION: Left knee radiograph.



VIEWS: 4



COMPARISON: None



INDICATION:51 years, Male, left anterior knee pain, fall.



FINDINGS: 

No acute fracture, dislocation or subluxation. Superior patellar enthesophyte.  Prepatellar soft tiss
ue swelling. Small suprapatellar joint effusion.



IMPRESSION:

No acute fracture or dislocation. Prepatellar soft tissue swelling.



Electronically signed by: Celsa Akers MD (10/2/2021 8:11 PM) [facility]

## 2025-03-17 NOTE — ED.ADGEN
Past History


Past Medical History:  Anxiety


Past Surgical History:  No Surgical History


Alcohol Use:  None


Drug Use:  None





Adult General


Chief Complaint


Chief Complaint


" I just not feeling well... I am under a lot of stress... Some nausea and some 

epigastric and chest discomfort like GERD..."





HPI


HPI





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 

with above hx of chest pain and near syncope.   Pt. diaphoretic and nauseated.  

Pt. patient denies any trauma. Has had recent travel. Currently under increased 

job stressors.  Patient denies prior history of cardiac disorders. However it 

does start in his family at age 49. Patient denies any specific ill exposures 

and has not been overseas recently.  Patient up-to-date with vaccinations. 

Normally follows at [facility]. No history of cardiac disorders.





Review of Systems


Review of Systems





Constitutional: Denies fever or chills []


Eyes: Denies change in visual acuity, redness, or eye pain []


HENT: Denies nasal congestion or sore throat []


Respiratory: Denies cough or shortness of breath []


Cardiovascular: No additional information not addressed in HPI []


GI: Denies abdominal pain, nausea, vomiting, bloody stools or diarrhea []


: Denies dysuria or hematuria []


Musculoskeletal: Denies back pain or joint pain []


Integument: Denies rash or skin lesions []


Neurologic: Denies headache, focal weakness or sensory changes []


Endocrine: Denies polyuria or polydipsia []





All other systems were reviewed and found to be within normal limits, except as 

documented in this note.





Family History


Family History


Cardiac disorders started  in range of 50 yrs.





Current Medications


Current Medications





Current Medications








 Medications


  (Trade)  Dose


 Ordered  [facility]  Start Time


 Stop Time Status Last Admin


Dose Admin


 


 Aspirin


  (Children'S


 Aspirin)  324 mg  1X  ONCE  10/29/18 19:00


 10/29/18 19:01 DC 10/29/18 19:18


324 MG


 


 Enoxaparin Sodium


  (Lovenox 100mg


 Syringe)  90 mg  1X  ONCE  10/29/18 20:00


 10/29/18 20:03 DC 10/29/18 20:32


90 MG


 


 Iohexol


  (Omnipaque 300


 Mg/ml)  75 ml  1X  ONCE  10/29/18 20:15


 10/29/18 20:16 DC 10/29/18 20:10


75 ML


 


 Lactated Ringer's  1,000 ml @ 


 1,000 mls/hr  Q1H  10/29/18 18:49


 10/29/18 19:48 DC 10/29/18 19:18


1,000 MLS/HR


 


 Lorazepam


  (Ativan)  1 mg  1X  ONCE  10/29/18 19:00


 10/29/18 19:01 DC 10/29/18 19:18


1 MG


 


 Magnesium


 Hydroxide


  (Milk Of


 Magnesia)  2,400 mg  1X  ONCE  10/29/18 19:45


 10/29/18 19:46 DC 10/29/18 20:32


2,400 MG


 


 Ondansetron HCl


  (Zofran)  8 mg  1X  ONCE  10/29/18 19:15


 10/29/18 19:16 DC 10/29/18 19:17


8 MG


 


 Potassium Chloride


  (KCl Oral Soln)  40 meq  1X  ONCE  10/29/18 19:45


 10/29/18 19:46 DC 10/29/18 20:31


40 MEQ





See nursing for home meds





Allergies


Allergies





Allergies








Coded Allergies Type Severity Reaction Last Updated Verified


 


  No Known Drug Allergies    10/29/18 No











Physical Exam


Physical Exam





Constitutional: Well developed, well nourished, in acute distress, non-toxic 

appearance. []


HENT: Normocephalic, atraumatic, bilateral external ears normal, oropharynx 

moist, no oral exudates, nose normal. []


Eyes: PERRLA, EOMI, conjunctiva normal, no discharge. [] 


Neck: Normal range of motion, no tenderness, supple, no stridor. [] 


Cardiovascular: Bradycardia Heart rate,  regular rhythm, no murmur []


Lungs & Thorax:  Bilateral breath sounds equal apex on auscultation []


Abdomen: Bowel sounds normal, soft, no tenderness, no masses, no pulsatile 

masses. [] 


Skin: Warm, very diaphoretic, no erythema, no rash. [] 


Back: No tenderness, no CVA tenderness. [] 


Extremities: No tenderness, no cyanosis, no clubbing, ROM intact, no edema. [No 

cording noted


Neurologic: Alert and oriented X 3, normal motor function, normal sensory 

function, no focal deficits noted. []


Psychologic: Affect anxious, judgement normal, mood normal. []





Current Patient Data


Vital Signs





 Vital Signs








  Date Time  Temp Pulse Resp B/P (MAP) Pulse Ox O2 Delivery O2 Flow Rate FiO2


 


10/29/18 21:49  48 16 134/87 (103) 98 Room Air  


 


10/29/18 18:37 98.5       








Lab Results





 Laboratory Tests








Test


 10/29/18


18:36 10/29/18


20:39


 


White Blood Count


 10.1 x10^3/uL


(4.0-11.0) 





 


Red Blood Count


 5.49 x10^6/uL


(4.30-5.70) 





 


Hemoglobin


 16.1 g/dL


(13.0-17.5) 





 


Hematocrit


 46.5 %


(39.0-53.0) 





 


Mean Corpuscular Volume


 85 fL ()


 





 


Mean Corpuscular Hemoglobin 29 pg (25-35)   


 


Mean Corpuscular Hemoglobin


Concent 35 g/dL


(31-37) 





 


Red Cell Distribution Width


 13.9 %


(11.5-14.5) 





 


Platelet Count


 231 x10^3/uL


(140-400) 





 


Neutrophils (%) (Auto) 41 % (31-73)   


 


Lymphocytes (%) (Auto) 48 % (24-48)   


 


Monocytes (%) (Auto) 8 % (0-9)   


 


Eosinophils (%) (Auto) 2 % (0-3)   


 


Basophils (%) (Auto) 1 % (0-3)   


 


Neutrophils # (Auto)


 4.1 x10^3uL


(1.8-7.7) 





 


Lymphocytes # (Auto)


 4.8 x10^3/uL


(1.0-4.8) 





 


Monocytes # (Auto)


 0.8 x10^3/uL


(0.0-1.1) 





 


Eosinophils # (Auto)


 0.2 x10^3/uL


(0.0-0.7) 





 


Basophils # (Auto)


 0.1 x10^3/uL


(0.0-0.2) 





 


Prothrombin Time


 9.8 SEC


(9.4-11.4) 





 


Prothrombin Time INR 1.0 (0.9-1.1)   


 


PTT


 21 SEC (23-33)


L 





 


D-Dimer (Lisette)


 0.61 mg/L


(0.00-0.50)  H 





 


Sodium Level


 141 mmol/L


(136-145) 





 


Potassium Level


 3.0 mmol/L


(3.5-5.1)  L 





 


Chloride Level


 101 mmol/L


() 





 


Carbon Dioxide Level


 26 mmol/L


(21-32) 





 


Anion Gap 14 (6-14)   


 


Blood Urea Nitrogen


 16 mg/dL


(8-26) 





 


Creatinine


 1.4 mg/dL


(0.7-1.3)  H 





 


Estimated GFR


(Cockcroft-Gault) 53.9  


 





 


Glucose Level


 107 mg/dL


(70-99)  H 





 


Calcium Level


 9.0 mg/dL


(8.5-10.1) 





 


Magnesium Level


 2.0 mg/dL


(1.8-2.4) 





 


Total Bilirubin


 0.5 mg/dL


(0.2-1.0) 





 


Direct Bilirubin


 0.1 mg/dL


(0.0-0.2) 





 


Aspartate Amino Transferase


(AST) 42 U/L (15-37)


H 





 


Alanine Aminotransferase (ALT)


 74 U/L (16-63)


H 





 


Alkaline Phosphatase


 109 U/L


() 





 


Creatine Kinase


 403 U/L


()  H 





 


Troponin I Quantitative


 < 0.017 ng/mL


(0-0.055) 





 


NT-Pro-B-Type Natriuretic


Peptide 43 pg/mL


(0-124) 





 


Total Protein


 7.7 g/dL


(6.4-8.2) 





 


Albumin


 4.4 g/dL


(3.4-5.0) 





 


Lipase


 173 U/L


() 





 


Urine Collection Type  Unknown  


 


Urine Color  Yellow  


 


Urine Clarity  Clear  


 


Urine pH  6.5  


 


Urine Specific Gravity  1.015  


 


Urine Protein


 


 Neg


(NEG-TRACE)


 


Urine Glucose (UA)


 


 Neg mg/dL


(NEG)


 


Urine Ketones (Stick)


 


 Neg mg/dL


(NEG)


 


Urine Blood  Neg (NEG)  


 


Urine Nitrite  Neg (NEG)  


 


Urine Bilirubin  Neg (NEG)  


 


Urine Urobilinogen Dipstick


 


 0.2 mg/dL (0.2


mg/dL)


 


Urine Leukocyte Esterase  Neg (NEG)  


 


Urine RBC  0 /HPF (0-2)  


 


Urine WBC  0 /HPF (0-4)  


 


Urine Squamous Epithelial


Cells 


 Occ /LPF  





 


Urine Bacteria


 


 0 /HPF (0-FEW)





 


Urine Opiates Screen  Neg (NEG)  


 


Urine Methadone Screen  Neg (NEG)  


 


Urine Barbiturates  Neg (NEG)  


 


Urine Phencyclidine Screen  Neg (NEG)  


 


Urine


Amphetamine/Methamphetamine 


 Neg (NEG)  





 


Urine Benzodiazepines Screen  Neg (NEG)  


 


Urine Cocaine Screen  Neg (NEG)  


 


Urine Cannabinoids Screen  Neg (NEG)  


 


Urine Ethyl Alcohol  Neg (NEG)  











EKG


EKG


My interpretation EKG shows a sinus at a rate of 66. No findings acute STEMI of 

contralateral changes.[]





Radiology/Procedures


Radiology/Procedures


I interpretation chest x-ray shows no acute cardiopulmonary findings.  CT of 

chest shows no obvious pulmonary embolism or acute pulmonary changes. See 

formal report when available.[]





Course & Med Decision Making


Course & Med Decision Making


Pertinent Labs and Imaging studies reviewed. (See chart for details)





Discussed options of treatment with patient. Has elected to stay for further 

observation and serial enzymes.  Will admit to Dr. Suarez with cardiology 

consult. 











[]





Final Impression


Final Impression


1. Chest pain


2. Near-syncope


3. Hypokalemia. 3.0 


4. Elevated creatinine 1.4


5. Mild elevation in d-dimer= 0.61


6. Elevated AST/ALT   42/74





Dragon Disclaimer


Dragon Disclaimer


This electronic medical record was generated, in whole or in part, using a 

voice recognition dictation system.











LINA MORGAN MD Oct 29, 2018 18:54
[[email or web address]] : I-STOP checked. Rx sent. Pt advised to sign up for [facility] portal to review labs and communicate any questions or concerns directly. Yearly physical and return as needed for illness, medication refills, and new or existing complaints.